# Patient Record
Sex: FEMALE | Race: ASIAN | URBAN - METROPOLITAN AREA
[De-identification: names, ages, dates, MRNs, and addresses within clinical notes are randomized per-mention and may not be internally consistent; named-entity substitution may affect disease eponyms.]

---

## 2017-01-28 ENCOUNTER — OFFICE VISIT - RIVER FALLS (OUTPATIENT)
Dept: FAMILY MEDICINE | Facility: CLINIC | Age: 22
End: 2017-01-28

## 2017-02-07 ENCOUNTER — OFFICE VISIT - RIVER FALLS (OUTPATIENT)
Dept: FAMILY MEDICINE | Facility: CLINIC | Age: 22
End: 2017-02-07

## 2017-02-07 ASSESSMENT — MIFFLIN-ST. JEOR: SCORE: 1296.53

## 2017-03-22 ENCOUNTER — OFFICE VISIT - RIVER FALLS (OUTPATIENT)
Dept: FAMILY MEDICINE | Facility: CLINIC | Age: 22
End: 2017-03-22

## 2017-11-02 ENCOUNTER — COMMUNICATION - RIVER FALLS (OUTPATIENT)
Dept: FAMILY MEDICINE | Facility: CLINIC | Age: 22
End: 2017-11-02

## 2017-11-02 ENCOUNTER — OFFICE VISIT - RIVER FALLS (OUTPATIENT)
Dept: FAMILY MEDICINE | Facility: CLINIC | Age: 22
End: 2017-11-02

## 2017-11-02 ASSESSMENT — MIFFLIN-ST. JEOR: SCORE: 1315.81

## 2017-11-07 ENCOUNTER — TRANSFERRED RECORDS (OUTPATIENT)
Dept: HEALTH INFORMATION MANAGEMENT | Facility: CLINIC | Age: 22
End: 2017-11-07

## 2017-11-07 LAB — HPV ABSTRACT: NORMAL

## 2017-11-29 ENCOUNTER — OFFICE VISIT - RIVER FALLS (OUTPATIENT)
Dept: FAMILY MEDICINE | Facility: CLINIC | Age: 22
End: 2017-11-29

## 2017-11-29 ASSESSMENT — MIFFLIN-ST. JEOR: SCORE: 1313.99

## 2018-01-11 ENCOUNTER — OFFICE VISIT - RIVER FALLS (OUTPATIENT)
Dept: FAMILY MEDICINE | Facility: CLINIC | Age: 23
End: 2018-01-11

## 2022-02-11 VITALS — WEIGHT: 125 LBS

## 2022-02-12 VITALS
HEIGHT: 66 IN | BODY MASS INDEX: 19.13 KG/M2 | WEIGHT: 119 LBS | SYSTOLIC BLOOD PRESSURE: 108 MMHG | TEMPERATURE: 98.1 F | DIASTOLIC BLOOD PRESSURE: 78 MMHG | BODY MASS INDEX: 18.4 KG/M2 | DIASTOLIC BLOOD PRESSURE: 60 MMHG | OXYGEN SATURATION: 98 % | HEIGHT: 66 IN | HEART RATE: 72 BPM | TEMPERATURE: 97.7 F | SYSTOLIC BLOOD PRESSURE: 100 MMHG

## 2022-02-12 VITALS
DIASTOLIC BLOOD PRESSURE: 60 MMHG | HEART RATE: 80 BPM | BODY MASS INDEX: 20.09 KG/M2 | TEMPERATURE: 97.5 F | HEART RATE: 68 BPM | DIASTOLIC BLOOD PRESSURE: 64 MMHG | SYSTOLIC BLOOD PRESSURE: 110 MMHG | WEIGHT: 125 LBS | HEIGHT: 66 IN | HEIGHT: 66 IN | SYSTOLIC BLOOD PRESSURE: 90 MMHG | WEIGHT: 124.6 LBS | BODY MASS INDEX: 20.03 KG/M2 | TEMPERATURE: 97.9 F

## 2022-02-12 VITALS
SYSTOLIC BLOOD PRESSURE: 124 MMHG | TEMPERATURE: 98.6 F | DIASTOLIC BLOOD PRESSURE: 74 MMHG | BODY MASS INDEX: 21.3 KG/M2 | HEART RATE: 80 BPM | WEIGHT: 129 LBS

## 2022-02-16 NOTE — PROGRESS NOTES
Patient:   TINO RODRIGUEZ            MRN: [id number]            FIN: [id number]               Age:   21 years     Sex:  Female     :  1995   Associated Diagnoses:   Sore throat   Author:   Angelo Salgado MD      Chief Complaint   2017 5:53 PM CST   c/o sore throat x 3-4 days.  Denies chills or sweats.  Very painful to swallow.  Non-prod cough.  Denies nausea.  Some intermittent dizziness.      History of Present Illness             The patient presents with a sore throat.  The sore throat is described as sharp pain.  The severity of the sore throat is moderate.  The timing/course of the sore throat is constant.  The sore throat has lasted for 3 day(s).  The context of the sore throat: occurred after starting new medication.  Associated symptoms consist of cough, difficulty swallowing, denies chills, denies ear pain and denies fever.  Additional pertinent history: Roommates and boyfriend have had colds and sore throats.  .        Review of Systems   Constitutional:  No fever, No chills, No sweats.    Ear/Nose/Mouth/Throat:  Sore throat.    Respiratory:  Cough.    Gastrointestinal:  No nausea.    Immunologic:  No recurrent fevers, No recurrent infections.    Neurologic:  No headache.       Health Status   Allergies:    Allergic Reactions (Selected)  No Known Medication Allergies   Medications:  (Selected)   Prescriptions  Prescribed  FLUoxetine 40 mg oral capsule: 1 cap(s) ( 40 mg ), PO, Daily, # 30 cap(s), 0 Refill(s), Type: Maintenance, Pharmacy: [id number], 1 cap(s) po daily  Documented Medications  Documented  Aviane 100 mcg-20 mcg oral tablet: 1 tab(s), PO, Daily, # 84 tab(s), 0 Refill(s), Type: Maintenance   Problem list:    All Problems  Depression / 792962837 / Confirmed  Low grade squamous intraepithelial lesion on cervical Papanicolaou smear / 1890663869 / Confirmed  Seasonal allergies / 902376796 / Confirmed      Histories   Social History:        Alcohol Assessment             Current      Tobacco Assessment            Never      Substance Abuse Assessment            Never      Employment and Education Assessment            Student, Work/School description: [employer] studying elementary education.  Highest education level: Some college.      Home and Environment Assessment            Marital status: Single.  Spouse/Partner name: lives with 4 roommates off campus.  Lives with               Roomate(s)/Friend(s).  0 children.      Nutrition and Health Assessment            Type of diet: Regular.      Exercise and Physical Activity Assessment            Exercise frequency: 3-4 times/week.  Exercise type: Running.      Sexual Assessment            Sexually active: Yes.  Sexual orientation: Straight or heterosexual.  Uses condoms: Yes.  Contraceptive Use               Details: Birth control pill.        Physical Examination   Vital Signs   11/29/2017 5:53 PM CST Temperature Tympanic 97.5 DegF  LOW    Peripheral Pulse Rate 80 bpm    Systolic Blood Pressure 110 mmHg    Diastolic Blood Pressure 60 mmHg    Mean Arterial Pressure 77 mmHg      Measurements from flowsheet : Measurements   11/29/2017 5:53 PM CST Height Measured - Standard 65.5 in    Weight Measured - Standard 124.6 lb    BSA 1.61 m2    Body Mass Index 20.42 kg/m2      General:  Alert and oriented, No acute distress.    Eye:  Pupils are equal, round and reactive to light, Normal conjunctiva.    HENT:  Tympanic membranes are clear, Oral mucosa is moist.         Throat: Tonsils ( Not enlarged, Not erythematous, No exudate ).    Neck:  Supple, No lymphadenopathy.    Respiratory:  Lungs are clear to auscultation, Respirations are non-labored.    Cardiovascular:  Normal rate, Regular rhythm, No edema.    Gastrointestinal:  Non-distended.    Musculoskeletal:  Normal gait.    Integumentary:  Warm, No rash.    Psychiatric:  Cooperative, Appropriate mood & affect, Normal judgment.       Impression and Plan   Diagnosis     Sore throat (PDX53-FB  J02.9).     Plan:  The rapid strep test was negative.  A full throat culture is pending and will take 2-3 days to return.  It is important to avoid dehydration so make sure to drink plenty of fluids even if your throat is sore.  Salt water gargles can help the throat pain.  Some sore throats respond to ice chips or popsicles while some people prefer warm fluids such as soup.  Tylenol can help the pain.  Return to the clinic if symptoms worsen or no improvement.      I, Tabatha Joshua, acted solely as a scribe for and in the presence of Dr. Angelo Salgado who performed the services.

## 2022-02-16 NOTE — PROGRESS NOTES
"   Patient:   TINO RODRIGUEZ            MRN: [id number]            FIN: [id number]               Age:   22 years     Sex:  Female     :  1995   Associated Diagnoses:   Anxiety; Depression   Author:   Lupe Del Toro      Visit Information      Primary Care Provider (PCP):  Lupe Del Toro    NPI# [id number]      Chief Complaint   2018 11:17 AM CST   f/u depression concerns, mole questions        History of Present Illness   PPC to discuss depression and anxiety  had stopped fluoxetine and felt depression worsened, she has restarted this and has been on it for 3d, no improvement in symptoms but is having esophagitis from it which she didn't before  she is wondering if \"there is more wrong\", \"I think I want to see a psychiatrist or someone to tell me what's really wrong\"  when asked to elaborate she tells me she is worried that it's not just depression  pt denies known brain trauma, no neurologic muscle weakness, denies hallucinations or delusions  she has worried about ADD/ADHD, has not taken [place] screening tool  denies thoughts of suicide  GAD7=18 PHQ9=15      Review of Systems   Constitutional:  Negative.    Ear/Nose/Mouth/Throat:  Negative.    Respiratory:  Negative.    Cardiovascular:  Negative.    Gastrointestinal:  Negative.    Hematology/Lymphatics:  Negative.    Immunologic:  Negative.    Musculoskeletal:  Negative.    Integumentary:  Negative.    Neurologic:  Negative.    Psychiatric:  Anxiety, Depression, No clarita, Not suicidal, Not delusional, No hallucinations.       Health Status   Allergies:    Allergic Reactions (Selected)  No Known Medication Allergies   Medications:  (Selected)   Prescriptions  Prescribed  FLUoxetine 40 mg oral capsule: 1 cap(s) ( 40 mg ), PO, Daily, # 90 cap(s), 0 Refill(s), Type: Maintenance, Pharmacy: [id number], 1 cap(s) po daily  Documented Medications  Documented  Aviane 100 mcg-20 mcg oral tablet: 1 tab(s), PO, Daily, # 84 tab(s), 0 " Refill(s), Type: Maintenance   Problem list:    All Problems  Seasonal allergies / SNOMED CT 424623235 / Confirmed  Low grade squamous intraepithelial lesion on cervical Papanicolaou smear / SNOMED CT 4107201042 / Confirmed  Depression / SNOMED CT 152971304 / Confirmed      Histories   Past Medical History:    Active  Low grade squamous intraepithelial lesion on cervical Papanicolaou smear ([id number]): Onset in the month of 10/2016 at 20 years  Depression (441414003): Onset in 2015 at 20 years.  Seasonal allergies (362043538)   Family History:    Patient was adopted.    Social History:        Alcohol Assessment            Current      Tobacco Assessment            Never      Substance Abuse Assessment            Never      Employment and Education Assessment            Student, Work/School description: [employer] studying elementary education.  Highest education level: Some college.      Home and Environment Assessment            Marital status: Single.  Spouse/Partner name: lives with 4 roommates off campus.  Lives with               Roomate(s)/Friend(s).  0 children.      Nutrition and Health Assessment            Type of diet: Regular.      Exercise and Physical Activity Assessment            Exercise frequency: 3-4 times/week.  Exercise type: Running.      Sexual Assessment            Sexually active: Yes.  Sexual orientation: Straight or heterosexual.  Uses condoms: Yes.  Contraceptive Use               Details: Birth control pill.        Physical Examination   Vital Signs   1/11/2018 11:17 AM CST Temperature Tympanic 98.6 DegF    Peripheral Pulse Rate 80 bpm    Pulse Site Radial artery    HR Method Manual    Systolic Blood Pressure 124 mmHg    Diastolic Blood Pressure 74 mmHg    Mean Arterial Pressure 91 mmHg    BP Site Right arm    BP Method Manual      General:  Alert and oriented, No acute distress.    Eye:  Pupils are equal, round and reactive to light.    HENT:  Normocephalic.    Neck:  Supple.     Musculoskeletal:  Normal range of motion.    Integumentary:  Warm, Dry, Pink.    Neurologic:  Alert, Oriented, Normal sensory, No focal deficits.    Psychiatric:  Cooperative, Appropriate mood & affect, Normal judgment, Non-suicidal.       Impression and Plan   Diagnosis     Anxiety (MTS38-CX F41.9).     Depression (ZLR35-TU F32.9).     Patient Instructions:          Limitations: Alcohol consumption.         Counseled: Patient, Regarding diagnosis, Regarding treatment, Regarding medications, Activity, Verbalized understanding.    Summary:  had long talk with pt, spening over 25 minutes with her today and over 50% in counseling and education  If she would like a referral to psychology I am happy to help, referral given  I understand wanting to know about ADD/ADHD as neurostimulants should not be used unless needed, because of this I will send to neuro psych for more formal evaluation but have also given her a [place] screening tool which I am happy to look at if she can fill it out and return it to the clinic  we talked about psychiatrist but Iris does not want to see at this time  we discussed stopping fluoxetine as it has been known to cause esophageal spasms but she would like to remain on this for another 2-3 weeks to see if this will stop  can add in 150mg zantac daily to see if this also helps, I will see her back in 3 weeks or sooner if needed.

## 2022-02-16 NOTE — PROGRESS NOTES
Patient:   TINO RODRIGUEZ            MRN: [id number]            FIN: [id number]               Age:   21 years     Sex:  Female     :  1995   Associated Diagnoses:   Well woman exam; Depression; History of abnormal cervical Pap smear; Needs flu shot; Screen for sexually transmitted diseases; Screening for cervical cancer; Urine frequency   Author:   Tiffany Chavez      Visit Information      Date of Service: 2017 10:51 am  Performing Location: [facility]  Encounter#: [id number]      Primary Care Provider (PCP):  Lupe Del Toro    NPI# [id number]      Referring Provider:  Tiffany Chavez    NPI# [id number]   Visit type:  Annual exam.    Source of history:  Self.    History limitation:  None.       Chief Complaint   2017 11:06 AM CDT   Here today to discuss fluoxetine.  Feels like she has vaginosis, and also amenorrhea.  Also needs flu vaccine.  Denies vag. discharge but is itchy.      Well Adult History   Tino is here for annual exam and pap smear with several additional concerns.  She had a pap with LGSIL in Oct 2016.  States she had colposcopy and biopsy at [facility] and was told to have f/u pap smear in Oct 2017.  She is sexually active with one male partner (Howard).  In relationship with him x 6 months.  Does not use condoms with him because he had no previous sexual partners.  Has not been screended for STIs recently.  Uses ocp and sometimes doesn't have withdrawal bleeding. Takes ocp as directed.  Denies breast tenderness or nausea.    Complains of recurrent vaginitis.  Has had bacterial vaginosis and was told by Ross Alvarez that she has lichen sclerosis.  Has not noted any abnormal discharge recently, but vulva feels irritated, burns when she voids.  Has been voiding often in past 2 weeks.  Denies hematuria, fever, flank pain.  She also complains of depression and anxiety.  Started prozac in , takes 20 mg/day.  Helped at first, but feels that  "prozac stopped working about a year ago.  She \"hates life\", lacks motivation and \"always feels down\".  PHQ9 score is 18, with \"thoughts that I would be better off dead or of hurting yourself in some way\" marked as \"more than half of the days in last two weeks\".  Iris states that she has thought about how she would kill herself, and has told her mom the details.  However, she states, \"I would never do it.\"  She has seen counselors at [facility] in the past, remembers liking [facility]erson.  She felt like she went \"as far as she could\" with their services, but needs more help than they can provide.  [facility] staff referred her to [facility], but she did not follow up.  Feels that she needs to change dose or try new anti-depressant.               Review of Systems   ROS reviewed as documented in chart   ROS negative except as documented in Well Adult History      Health Status   Allergies:    Allergic Reactions (Selected)  No Known Medication Allergies   Medications:  (Selected)   Prescriptions  Prescribed  FLUoxetine 40 mg oral capsule: 1 cap(s) ( 40 mg ), PO, Daily, # 30 cap(s), 0 Refill(s), Type: Maintenance, Pharmacy: [id number], 1 cap(s) po daily  Documented Medications  Documented  Aviane 100 mcg-20 mcg oral tablet: 1 tab(s), PO, Daily, # 84 tab(s), 0 Refill(s), Type: Maintenance   Problem list:    All Problems  Depression / SNOMED CT 070401751 / Confirmed  Low grade squamous intraepithelial lesion on cervical Papanicolaou smear / SNOMED CT 1797725939 / Confirmed  Seasonal allergies / SNOMED CT 268545714 / Confirmed      Histories   Past Medical History:    Active  Low grade squamous intraepithelial lesion on cervical Papanicolaou smear (3596597221): Onset in the month of 10/2016 at 20 years  Depression (861848082): Onset in 2015 at 20 years.  Seasonal allergies (756241564)   Family History:    Patient was adopted.    Procedure history:    Colposcopy (5736936615) in 2016 at 21 Years.   Social " History:        Alcohol Assessment: Current            Current      Tobacco Assessment: Denies Tobacco Use            Never      Substance Abuse Assessment: Denies Substance Abuse            Never      Employment and Education Assessment            Student, Work/School description: [employer] studying elementary education.  Highest education level: Some college.      Home and Environment Assessment            Marital status: Single.  Spouse/Partner name: lives with 4 roommates off campus.  Lives with               Roomate(s)/Friend(s).  0 children.      Nutrition and Health Assessment            Type of diet: Regular.      Exercise and Physical Activity Assessment: Regular exercise            Exercise frequency: 3-4 times/week.  Exercise type: Running.      Sexual Assessment            Sexually active: Yes.  Sexual orientation: Straight or heterosexual.  Uses condoms: Yes.  Contraceptive Use               Details: Birth control pill.        Physical Examination   Last Menstrual Period: 9/19/2017   Vital Signs   11/2/2017 11:06 AM CDT Temperature Temporal 97.9 DegF    Peripheral Pulse Rate 68 bpm    Systolic Blood Pressure 90 mmHg    Diastolic Blood Pressure 64 mmHg    Mean Arterial Pressure 73 mmHg      Measurements from flowsheet : Measurements   11/2/2017 11:06 AM CDT Height Measured - Standard 65.5 in    Weight Measured - Standard 125 lb    BSA 1.62 m2    Body Mass Index 20.48 kg/m2      General:  Alert and oriented, Mild distress.    Eye:  Normal conjunctiva, Vision unchanged.    HENT:  Normocephalic, Normal hearing, Oral mucosa is moist, No pharyngeal erythema.    Neck:  Supple, Non-tender, No lymphadenopathy, No thyromegaly.    Respiratory:  Lungs are clear to auscultation, Respirations are non-labored, Breath sounds are equal.    Cardiovascular:  Normal rate, Regular rhythm, No edema.    Breast:  No mass, No tenderness, No discharge, inverted nipples.    Gastrointestinal:  Soft, Non-tender, Non-distended, Normal  bowel sounds.    Genitourinary:  No costovertebral angle tenderness.    Gynecology:          Labia: Bilateral, no loss of pigment, Not atrophied, No erythema.         Vagina: scant white discharge, No lesions.         Cervix: Within normal limits, No cervical motion tenderness.         Uterus: Mobile, Not enlarged, Not tender.         Ovaries: Bilateral, Within normal limits.    Musculoskeletal:  Normal range of motion, Normal strength, Normal gait.    Integumentary:  Warm, Dry, Intact, No rash, 2 mm mole on back of neck, spot of black on face of otherwise brown mole.    Neurologic:  Alert, Oriented.    Psychiatric:  Cooperative, Normal judgment, Non-suicidal.       Health Maintenance   Immunizations   Has not had flu vaccine this season      Review / Management   Results review      Impression and Plan   Diagnosis     Well woman exam (YRS35-BN Z01.419).     Depression (YTU95-GP F32.9).     History of abnormal cervical Pap smear (SPZ79-CR Z87.898).     Needs flu shot (PAU56-GY Z23).     Screen for sexually transmitted diseases (XTI00-DN Z11.3).     Screening for cervical cancer (EYG84-QX Z12.4).     Urine frequency (THH28-AP R35.0).     Plan   UA/UC, UPT, GC/CT screen, pap smear with HPV co-testing, wet prep today.  Will contact pt with results when available.  Flu shot given.  No evidence of active lichen sclerosis today.  Long discussion of pt's mental health concerns and options.  She is able to contract for safety.  Offered same day visit with Ines at [facility] today at 3pm.  Pt declines, stating that she would rather see psychiatrist. She prefers to f/u with [facility].  Consulted with ALEM Kelley.  Will adjust dose of prozac to 40 mg/day.  Advised pt to f/u with psych asap, and with Erasmo in 2-4 weeks.

## 2022-02-16 NOTE — PROGRESS NOTES
Patient:   TINO RODRIGUEZ            MRN: [id number]            FIN: [id number]               Age:   21 years     Sex:  Female     :  1995   Associated Diagnoses:   Laceration of middle finger   Author:   Fortunato CARNES, Cr      Flap laceration of right middle finger while cutting meat last night.        Physical Examination   General:  Vital Signs   2017 9:52 AM CST Temperature Tympanic 97.7 DegF  LOW    Pulse Site Radial artery    HR Method Manual    Systolic Blood Pressure 108 mmHg    Diastolic Blood Pressure 78 mmHg    Mean Arterial Pressure 88 mmHg    BP Site Right arm    BP Method Manual      .       Procedure   Laceration repair procedure   Date:  2017.     Confirmed: patient.     Location: the right middle finger, 12 mm flap laceration.     Preparation and technique: anesthesia digital block 1% xylocaine without epinephrine 2 ml, skin prepped with sterile saline, wound explored no foreign material observed, skin closure completed with sutures (using 5-0, 3  sutures placed, interrupted technique), drydressing applied.     Procedure tolerated: well.     No Complications.        Impression and Plan   Diagnosis     Laceration of middle finger (DVM96-UQ S61.218A).     Orders     Orders   Requests (Return to Office):  Return to Clinic (Request) (Order): RFV: suture removal, Return in 10 days  Charges:  40909 unlisted px skin muc membrane +subq tissue (Charge) (Order): Quantity: 1, Laceration of middle finger.

## 2022-02-16 NOTE — PROGRESS NOTES
Patient:   TINO RODRIGUEZ            MRN: [id number]            FIN: [id number]               Age:   21 years     Sex:  Female     :  1995   Associated Diagnoses:   Visit for suture removal   Author:   Cr Bucio MD      Visit Information      Date of Service: 2017 10:47 am  Performing Location: [facility]  Encounter#: [id number]      Chief Complaint   2017 10:55 AM CST    Patient presents today for suture removal. Eleven days ago suture were placed  on her right middle finger while cutting meat last night. Patient got a flap laceration. Wound is healing well. 2 days ago she closed middle figer on door and was bleeding.        Physical Examination   Vital Signs   2017 10:55 AM CST Temperature Tympanic 98.1 DegF    Peripheral Pulse Rate 72 bpm    HR Method Electronic    Systolic Blood Pressure 100 mmHg    Diastolic Blood Pressure 60 mmHg    Mean Arterial Pressure 73 mmHg    BP Site Left arm    BP Method Manual    Oxygen Saturation 98 %         Procedure   Staple/ Suture removal procedure   Date:  2017.     Preparation and technique: wound cleaning.     Wound assessment:: the left middle finger, pink in color, characteristics clean, incisional margin confluent, no discharge, no inflammatory changes.     Insertion/ Removal: Date sutured  2017, Number of sutures removed  3.     Dressing applied: adhesive strip.     Procedure tolerated: well.     Complications: none.        Impression and Plan   Diagnosis     Visit for suture removal (VZW74-QF Z48.02).

## 2022-02-16 NOTE — NURSING NOTE
Patient states she has moved back home and will no longer be seeking care @ [facility].

## 2022-02-16 NOTE — PROCEDURES
Accession Number:       [id number]  CLINICAL INFORMATION::     Hx of LSIL or higher Pap/Bx  LMP::     09/09/17  PREV. PAP::     2016 LSIL  PREV. BX::     NONE GIVEN  SOURCE::     Cervix, Endocervix  STATEMENT OF ADEQUACY::     Satisfactory for evaluation. Endocervical/transformation zone component present.  GENERAL CATEGORIZATION::     EPITHELIAL CELL ABNORMALITY  INTERPRETATION/RESULT::     Atypical Squamous Cells of Undetermined Significance (ASC-US)  COMMENT::     This Pap test has been evaluated with computer assisted technology.  CYTOTECHNOLOGIST::     JAG AGRAWAL(ASCP) CT Screening location: [place] [address]  PATHOLOGIST::     See comment       Jb Lynch Jr., M.D., Board Certified in Anatomic       Pathology, Clinical Pathology and Cytopathology,       5    (electronic signature)  HPV mRNA E6/E7:     See comment         Test not performed.       Due to low cellular content there       was not enough sample after Pap slide       preparation.

## 2022-02-16 NOTE — PROGRESS NOTES
Patient:   TINO RODRIGUEZ            MRN: [id number]            FIN: [id number]               Age:   21 years     Sex:  Female     :  1995   Associated Diagnoses:   Screen for STD (sexually transmitted disease); Vaginitis   Author:   Gaviota Henderson      Visit Information      Date of Service: 2017 01:04 pm  Performing Location: [facility]  Encounter#: [id number]      Primary Care Provider (PCP):  Lupe Del Toro    NPI# [id number]      Chief Complaint   3/22/2017 1:08 PM CDT    Patient is here with c/o frequent urination, and vaginal itching, abnormal discharge. Would like STD testing.        History of Present Illness   Chief complaint reviewed and confirmed with patient. Reports white vaginal discharge, increased itching and urinary frequency x2-3 days. Denies recent antibiotic use. Has history of yeast infections and bacterial vaginitis, reports this feels similar to previous infections. Also reports history of episodic lichen sclerosis, denies these symptoms at this time. Would like testing for yeast, BV, as well as chlamydia and gonorrhea, declines syphilis and HIV testing at this time.        Review of Systems   Constitutional:  Negative.    Genitourinary:  Negative except as documented in history of present illness.    Gynecologic:  Negative except as documented in history of present illness.       Health Status   Allergies:    Allergic Reactions (Selected)  No Known Medication Allergies   Medications:  (Selected)   Prescriptions  Prescribed  FLUoxetine 20 mg oral tablet: 1 tab(s) ( 20 mg ), PO, Daily, # 90 tab(s), 3 Refill(s), Type: Maintenance, Pharmacy: [id number], 1 tab(s) po daily,x90 day(s)  Junel Fe 1.5/30 oral tablet: 1 tab(s), po, daily, # 84 tab(s), 3 Refill(s), Type: Maintenance, Pharmacy: [id number], 1 tab(s) po daily,x84 day(s)   Problem list:    All Problems  Seasonal allergies / SNOMED CT [id number] / Confirmed       Histories   Past Medical History:    Active  Seasonal allergies ([id number])   Family History:    Patient was adopted.    Procedure history:    No active procedure history items have been selected or recorded.   Social History:        Alcohol Assessment: Current            Current      Tobacco Assessment: Denies Tobacco Use            Never      Substance Abuse Assessment: Denies Substance Abuse            Never      Employment and Education Assessment            Student      Exercise and Physical Activity Assessment: Regular exercise            Exercise frequency: 3-4 times/week.  Exercise type: Running.      Sexual Assessment            Sexually active: No.        Physical Examination   Vital Signs   3/22/2017 1:08 PM CDT BP Site Right arm    BP Method Electronic      Measurements from flowsheet : Measurements   3/22/2017 1:08 PM CDT    Weight Measured - Standard                125 lb     General:  Alert and oriented, No acute distress.    Genitourinary:          Labia: Within normal limits.         Vagina: Discharge ( White, cheese-like ), Mucosa ( Within normal limits ).    Neurologic:  Alert, Oriented.    Psychiatric:  Cooperative, Appropriate mood & affect.       Impression and Plan   Diagnosis     Screen for STD (sexually transmitted disease) (VVO33-VB Z11.3).     Vaginitis (DRU22-BV N76.0).     Plan:  1. Wet prep and GC/Chlamydia culture obtained - will notify patient of results and subsequent treatment  2. Education given related to possible causes of yeast and BV including recent course of antibiotics, recent period and change in sexual partner  3. Warm sitz baths  4. Return to clinic if symptoms worsen or do not improve.    Patient Instructions:       Counseled: Patient, Regarding diagnosis, Regarding treatment, Verbalized understanding.